# Patient Record
Sex: MALE | NOT HISPANIC OR LATINO | URBAN - METROPOLITAN AREA
[De-identification: names, ages, dates, MRNs, and addresses within clinical notes are randomized per-mention and may not be internally consistent; named-entity substitution may affect disease eponyms.]

---

## 2024-01-31 ENCOUNTER — OFFICE VISIT (OUTPATIENT)
Dept: PRIMARY CARE | Facility: CLINIC | Age: 29
End: 2024-01-31
Payer: COMMERCIAL

## 2024-01-31 VITALS
OXYGEN SATURATION: 97 % | SYSTOLIC BLOOD PRESSURE: 126 MMHG | DIASTOLIC BLOOD PRESSURE: 89 MMHG | HEIGHT: 77 IN | WEIGHT: 246 LBS | HEART RATE: 87 BPM | BODY MASS INDEX: 29.05 KG/M2

## 2024-01-31 DIAGNOSIS — F33.40 RECURRENT MAJOR DEPRESSIVE DISORDER, IN REMISSION (CMS-HCC): ICD-10-CM

## 2024-01-31 DIAGNOSIS — Z00.00 HEALTH CARE MAINTENANCE: Primary | ICD-10-CM

## 2024-01-31 DIAGNOSIS — L30.9 ECZEMA, UNSPECIFIED TYPE: ICD-10-CM

## 2024-01-31 DIAGNOSIS — J45.40 MODERATE PERSISTENT ASTHMA WITHOUT COMPLICATION (HHS-HCC): ICD-10-CM

## 2024-01-31 PROBLEM — J45.909 ASTHMA (HHS-HCC): Status: ACTIVE | Noted: 2024-01-31

## 2024-01-31 PROBLEM — J45.909 ASTHMA (HHS-HCC): Status: RESOLVED | Noted: 2024-01-31 | Resolved: 2024-01-31

## 2024-01-31 PROCEDURE — 1036F TOBACCO NON-USER: CPT | Performed by: INTERNAL MEDICINE

## 2024-01-31 PROCEDURE — 99395 PREV VISIT EST AGE 18-39: CPT | Performed by: INTERNAL MEDICINE

## 2024-01-31 RX ORDER — LORATADINE 10 MG/1
10 TABLET ORAL DAILY
COMMUNITY
Start: 2022-09-20

## 2024-01-31 RX ORDER — BUDESONIDE AND FORMOTEROL FUMARATE DIHYDRATE 80; 4.5 UG/1; UG/1
2 AEROSOL RESPIRATORY (INHALATION) EVERY 12 HOURS
Qty: 10.2 G | Refills: 1 | Status: SHIPPED
Start: 2024-01-31 | End: 2024-02-05 | Stop reason: SDUPTHER

## 2024-01-31 RX ORDER — BUPROPION HYDROCHLORIDE 300 MG/1
300 TABLET ORAL EVERY MORNING
COMMUNITY
Start: 2024-01-18

## 2024-01-31 RX ORDER — ALBUTEROL SULFATE 90 UG/1
2 AEROSOL, METERED RESPIRATORY (INHALATION) EVERY 4 HOURS PRN
Qty: 8.5 G | Refills: 0 | Status: SHIPPED | OUTPATIENT
Start: 2024-01-31 | End: 2025-01-30

## 2024-01-31 RX ORDER — EPINEPHRINE 0.3 MG/.3ML
1 INJECTION INTRAMUSCULAR ONCE AS NEEDED
COMMUNITY

## 2024-01-31 RX ORDER — BUDESONIDE AND FORMOTEROL FUMARATE DIHYDRATE 80; 4.5 UG/1; UG/1
2 AEROSOL RESPIRATORY (INHALATION) EVERY 12 HOURS
COMMUNITY
Start: 2022-06-02 | End: 2024-01-31 | Stop reason: SDUPTHER

## 2024-01-31 NOTE — PROGRESS NOTES
Subjective   Patient ID: Odell Anton is a 28 y.o. male who presents for [facility] (New patient here to [facility]).        HPI     Patient is a 28-year-old male with past medical history of major depression anxiety obesity eczema and asthma who presents to [facility] care and for wellness.  Patient last saw a physician who has since retired from [facility].  He has an allergist whom he has seen in [facility] however has not been seen recently.    He denies any complaints at this time however he does use Symbicort and he needs a prior authorization for it.  He takes 2 puffs twice daily no recent exacerbations and no recent hospitalizations for his asthma.  He did go to the emergency room in mid 2023 for abdominal pain rule out appendicitis however was unremarkable and subsequently with conservative care.    Patient has a girlfriend.  He has a job working on [employer].     He has a psychiatrist and a psychologist whom he follows for major depression.  Recently started on bupropion.  Of note he has gained 30 pounds in the last 6 months however recently since starting the Wellbutrin he is lost 8 pounds.  He is making a concerted effort to help lose weight.  He states that he eats home-cooked meals regularly but goes out twice weekly.    No other complaints at this time    Review of Systems  Constitutional: No fever or chills, No Night Sweats  Eyes: No Blurry Vision or Eye sight problems  ENT: No Nasal Discharge, Hoarseness, sore throat  Cardiovascular: no chest pain, no palpitations and no syncope.   Respiratory: no cough, no shortness of breath during exertion and no shortness of breath at rest.   Gastrointestinal: no abdominal pain, no nausea and no vomiting.   : No issues with urinary stream, burning with urination, no blood in urine or stools  Skin: No Skin rashes or Lesions  Neuro: No Headache, no dizziness or Numbness or tingling  Psych: No Anxiety, depression or sleeping problems  Heme: No  "Easy bleeding or brusing.     Objective   /89   Pulse 87   Ht 1.956 m (6' 5\")   Wt 112 kg (246 lb)   SpO2 97%   BMI 29.17 kg/m²     Physical Exam  Constitutional: Alert and in no acute distress. Well developed, well nourished.   Head and Face: Head and face: Normal.    Eyes: Normal external exam. Pupils were equal in size, round, reactive to light (PERRL) with normal accommodation and extraocular movements intact (EOMI).   Ears, Nose, Mouth, and Throat: External inspection of ears and nose: Normal.  Hearing: Normal.  Nasal mucosa, septum, and turbinates: Normal.  Lips, teeth, and gums: Normal.  Oropharynx: Normal.   Neck: No neck mass was observed. Supple. Thyroid not enlarged and there were no palpable thyroid nodules.   Cardiovascular: Heart rate and rhythm were normal, normal S1 and S2. Pedal pulses: Normal. No peripheral edema.   Pulmonary: No respiratory distress. Clear bilateral breath sounds.   Abdomen: Soft nontender; no abdominal mass palpated. Normal bowel sounds. No organomegaly.   : Deferred  Musculoskeletal: No joint swelling seen, normal movements of all extremities. Range of motion: Normal.  Muscle strength/tone: Normal.    Skin: Eczematous rash on the anterior and posterior torso  Neurologic: Deep tendon reflexes were 2+ and symmetric.   Psychiatric: Judgment and insight: Intact. Mood and affect: Normal.  Lymphatic: No cervical lymphadenopathy. Palpation of lymph nodes in axillae: Normal.  Palpation of lymph nodes in groin: Normal.    No results found for: \"WBC\", \"HGB\", \"HCT\", \"PLT\", \"CHOL\", \"TRIG\", \"HDL\", \"LDLDIRECT\", \"ALT\", \"AST\", \"NA\", \"K\", \"CL\", \"CREATININE\", \"BUN\", \"CO2\", \"TSH\", \"PSA\", \"INR\", \"GLUF\", \"HGBA1C\", \"ALBUR\"    XR hand right 3+ views  PROCEDURE:         HAND RT MIN 3 VIEW - IXR  0055  REASON FOR EXAM: thumb injury    RESULT: HAND RT MIN 3 VIEW: 10/1/2019 10:05 PM    CLINICAL HISTORY: Pain after jamming the thumb earlier today    COMPARISON: None.    TECHNIQUE: Three  views " of the Right hand were performed with additional  view of right thumb completed..    FINDINGS:    Bones: The bones appear intact.    Joints: The joints are maintained.    Soft tissues: Unremarkable.    IMPRESSION:    No acute bony abnormality..    [id number]    This report has been produced using speech recognition.    Original Interpreting Physician:   RICHARD MARTINEZ M.D.  Original Transcribed by/Date: PSCB   Oct  1 2019 10:21P  Original Electronically Signed by/Date: RICHARD MARTINEZ M.D. Oct  1 2019 10:21P    Addendum Interpreting Physician:  Addendum Transcribed by/Date: NO ADDENDUM  Addendum Electronically Signed by/Date:      Assessment/Plan   Problem List Items Addressed This Visit             ICD-10-CM    Moderate persistent asthma without complication J45.40     Will provide refill of the Symbicort.  May require prior authorization.  Will provide albuterol as needed         Relevant Medications    budesonide-formoteroL (Symbicort) 80-4.5 mcg/actuation inhaler    albuterol (ProAir HFA) 90 mcg/actuation inhaler    Eczema L30.9     Continue Claritin and follow-up with allergist         Recurrent major depressive disorder, in remission ([facility]) F33.40     Patient following with psychiatry.  Recently placed on Wellbutrin with good results.  Continue with weight reduction efforts.  Continue following with psychologist          Other Visit Diagnoses         Codes    Health care maintenance    -  Primary Z00.00              Dear Odell Anton     It was my pleasure to take care of you today in the office. Below are the things we discussed today:    1. Immunizations: Yearly Flu shot is recommended.        Please find out if you are up-to-date on your hep B vaccine    2. Blood Work: Up-to-date  3. Seen your dentist twice a year  4. Yearly Eye exam is recommended    5. BMI: 29.2  6: Diet recommendations:   Eat Clean, Try to have as many home cooked meals as possible  Avoid processed foods which contain excess calories, sugar,  and sodium.    7. Exercise recommendations:   150 minutes a week to maintain your weight     If you have to loose weight, you need a better diet and exercise plan.     8. Please get your Living will / Advance directive completed if you do not have one already. Please make sure our office has a copy of the latest one.     9. Colonoscopy: N/A  10. PSA: N/A    11.     Follow up in one year for a Physical. Please call the office before your Physical to see if you need blood work completed prior to your physical.     Please call me if any questions arise from now until your next visit. I will call you after I am done seeing patients. A Doctor is always available by phone when the office is closed. Please feel free to call for help with any problem that you feel shouldn't wait until the office re-opens.     Remy Fletcher, DO

## 2024-01-31 NOTE — ASSESSMENT & PLAN NOTE
Patient following with psychiatry.  Recently placed on Wellbutrin with good results.  Continue with weight reduction efforts.  Continue following with psychologist

## 2024-01-31 NOTE — ASSESSMENT & PLAN NOTE
Will provide refill of the Symbicort.  May require prior authorization.  Will provide albuterol as needed

## 2024-02-05 DIAGNOSIS — J45.40 MODERATE PERSISTENT ASTHMA WITHOUT COMPLICATION (HHS-HCC): ICD-10-CM

## 2024-02-06 RX ORDER — BUDESONIDE AND FORMOTEROL FUMARATE DIHYDRATE 80; 4.5 UG/1; UG/1
2 AEROSOL RESPIRATORY (INHALATION) EVERY 12 HOURS
Qty: 30.6 G | Refills: 3 | Status: SHIPPED | OUTPATIENT
Start: 2024-02-06 | End: 2024-04-23 | Stop reason: SDUPTHER

## 2024-03-06 ENCOUNTER — PATIENT MESSAGE (OUTPATIENT)
Dept: PRIMARY CARE | Facility: CLINIC | Age: 29
End: 2024-03-06
Payer: COMMERCIAL

## 2024-04-23 DIAGNOSIS — J45.40 MODERATE PERSISTENT ASTHMA WITHOUT COMPLICATION (HHS-HCC): ICD-10-CM

## 2024-04-24 RX ORDER — BUDESONIDE AND FORMOTEROL FUMARATE DIHYDRATE 80; 4.5 UG/1; UG/1
2 AEROSOL RESPIRATORY (INHALATION) EVERY 12 HOURS
Qty: 10.2 G | Refills: 0 | Status: SHIPPED | OUTPATIENT
Start: 2024-04-24 | End: 2024-06-05

## 2024-06-04 DIAGNOSIS — J45.40 MODERATE PERSISTENT ASTHMA WITHOUT COMPLICATION (HHS-HCC): ICD-10-CM

## 2024-06-05 RX ORDER — BUDESONIDE AND FORMOTEROL FUMARATE DIHYDRATE 80; 4.5 UG/1; UG/1
2 AEROSOL RESPIRATORY (INHALATION) EVERY 12 HOURS
Qty: 10.2 G | Refills: 2 | Status: SHIPPED | OUTPATIENT
Start: 2024-06-05

## 2024-11-07 ENCOUNTER — APPOINTMENT (OUTPATIENT)
Dept: PRIMARY CARE | Facility: CLINIC | Age: 29
End: 2024-11-07
Payer: COMMERCIAL

## 2024-11-07 VITALS
HEART RATE: 105 BPM | OXYGEN SATURATION: 94 % | HEIGHT: 77 IN | BODY MASS INDEX: 28.1 KG/M2 | WEIGHT: 238 LBS | SYSTOLIC BLOOD PRESSURE: 137 MMHG | DIASTOLIC BLOOD PRESSURE: 82 MMHG

## 2024-11-07 DIAGNOSIS — Z00.00 HEALTH CARE MAINTENANCE: Primary | ICD-10-CM

## 2024-11-07 DIAGNOSIS — T78.2XXD ANAPHYLAXIS, SUBSEQUENT ENCOUNTER: ICD-10-CM

## 2024-11-07 DIAGNOSIS — Z30.09 VASECTOMY EVALUATION: ICD-10-CM

## 2024-11-07 DIAGNOSIS — J45.40 MODERATE PERSISTENT ASTHMA WITHOUT COMPLICATION (HHS-HCC): ICD-10-CM

## 2024-11-07 PROBLEM — I10 PRIMARY HYPERTENSION: Status: ACTIVE | Noted: 2024-11-07

## 2024-11-07 PROCEDURE — 3075F SYST BP GE 130 - 139MM HG: CPT | Performed by: INTERNAL MEDICINE

## 2024-11-07 PROCEDURE — 1036F TOBACCO NON-USER: CPT | Performed by: INTERNAL MEDICINE

## 2024-11-07 PROCEDURE — 3079F DIAST BP 80-89 MM HG: CPT | Performed by: INTERNAL MEDICINE

## 2024-11-07 PROCEDURE — 3008F BODY MASS INDEX DOCD: CPT | Performed by: INTERNAL MEDICINE

## 2024-11-07 PROCEDURE — 99395 PREV VISIT EST AGE 18-39: CPT | Performed by: INTERNAL MEDICINE

## 2024-11-07 RX ORDER — EPINEPHRINE 0.3 MG/.3ML
1 INJECTION INTRAMUSCULAR ONCE AS NEEDED
Qty: 2 EACH | Refills: 0 | Status: SHIPPED | OUTPATIENT
Start: 2024-11-07

## 2024-11-07 RX ORDER — BUDESONIDE AND FORMOTEROL FUMARATE DIHYDRATE 80; 4.5 UG/1; UG/1
2 AEROSOL RESPIRATORY (INHALATION) EVERY 12 HOURS
Qty: 10.2 G | Refills: 2 | Status: SHIPPED | OUTPATIENT
Start: 2024-11-07

## 2024-11-07 NOTE — PROGRESS NOTES
"Subjective   Patient ID: Odell Anton is a 29 y.o. male who presents for Annual Exam.        HPI     Patient is a 29-year-old male with past medical history of major depression anxiety obesity eczema and asthma who presents to establish care and for wellness.      He denies any complaints at this time however he does use Symbicort.  He takes 2 puffs twice daily no recent exacerbations and no recent hospitalizations for his asthma.     Patient has a girlfriend.  He has a job working on [employer].  He has 2 sexual partners    Mood is stable on Wellbutrin.  Overall doing well.    No other complaints at this time besides regularly.  Denies illicit substances smoking and drinks alcohol occasionally    Review of Systems  Constitutional: No fever or chills, No Night Sweats  Eyes: No Blurry Vision or Eye sight problems  ENT: No Nasal Discharge, Hoarseness, sore throat  Cardiovascular: no chest pain, no palpitations and no syncope.   Respiratory: no cough, no shortness of breath during exertion and no shortness of breath at rest.   Gastrointestinal: no abdominal pain, no nausea and no vomiting.   : No issues with urinary stream, burning with urination, no blood in urine or stools  Skin: No Skin rashes or Lesions  Neuro: No Headache, no dizziness or Numbness or tingling  Psych: No Anxiety, depression or sleeping problems  Heme: No Easy bleeding or brusing.     Objective   /82   Pulse 105   Ht 1.956 m (6' 5\")   Wt 108 kg (238 lb)   SpO2 94%   BMI 28.22 kg/m²     Physical Exam  Constitutional: Alert and in no acute distress. Well developed, well nourished.   Head and Face: Head and face: Normal.    Eyes: Normal external exam. Pupils were equal in size, round, reactive to light (PERRL) with normal accommodation and extraocular movements intact (EOMI).   Ears, Nose, Mouth, and Throat: External inspection of ears and nose: Normal.  Hearing: Normal.  Nasal mucosa, septum, and turbinates: Normal.  Lips, teeth, and " "gums: Normal.  Oropharynx: Normal.   Neck: No neck mass was observed. Supple. Thyroid not enlarged and there were no palpable thyroid nodules.   Cardiovascular: Heart rate and rhythm were normal, normal S1 and S2. Pedal pulses: Normal. No peripheral edema.   Pulmonary: No respiratory distress. Clear bilateral breath sounds.   Abdomen: Soft nontender; no abdominal mass palpated. Normal bowel sounds. No organomegaly.   : Deferred  Musculoskeletal: No joint swelling seen, normal movements of all extremities. Range of motion: Normal.  Muscle strength/tone: Normal.    Skin: Eczematous rash on the anterior and posterior torso  Neurologic: Deep tendon reflexes were 2+ and symmetric.   Psychiatric: Judgment and insight: Intact. Mood and affect: Normal.  Lymphatic: No cervical lymphadenopathy. Palpation of lymph nodes in axillae: Normal.  Palpation of lymph nodes in groin: Normal.    No results found for: \"WBC\", \"HGB\", \"HCT\", \"PLT\", \"CHOL\", \"TRIG\", \"HDL\", \"LDLDIRECT\", \"ALT\", \"AST\", \"NA\", \"K\", \"CL\", \"CREATININE\", \"BUN\", \"CO2\", \"TSH\", \"PSA\", \"INR\", \"GLUF\", \"HGBA1C\", \"ALBUR\"    XR hand right 3+ views  PROCEDURE:         HAND RT MIN 3 VIEW - IXR  0055  REASON FOR EXAM: thumb injury    RESULT: HAND RT MIN 3 VIEW: 10/1/2019 10:05 PM    CLINICAL HISTORY: Pain after jamming the thumb earlier today    COMPARISON: None.    TECHNIQUE: Three  views of the Right hand were performed with additional  view of right thumb completed..    FINDINGS:    Bones: The bones appear intact.    Joints: The joints are maintained.    Soft tissues: Unremarkable.    IMPRESSION:    No acute bony abnormality..    [id number]    This report has been produced using speech recognition.    Original Interpreting Physician:   RICHARD MARTINEZ M.D.  Original Transcribed by/Date: PSCB   Oct  1 2019 10:21P  Original Electronically Signed by/Date: RICHARD MARTINEZ M.D. Oct  1 2019 10:21P    Addendum Interpreting Physician:  Addendum Transcribed by/Date: NO ADDENDUM  Addendum " Electronically Signed by/Date:      Assessment/Plan   Problem List Items Addressed This Visit             ICD-10-CM    Moderate persistent asthma without complication ([facility]) J45.40    Relevant Medications    budesonide-formoteroL (Symbicort) 80-4.5 mcg/actuation inhaler    Health care maintenance - Primary Z00.00    Relevant Medications    EPINEPHrine (EpiPen 2-Jerry) 0.3 mg/0.3 mL injection syringe    Other Relevant Orders    CBC    Comprehensive metabolic panel    Lipid Panel    TSH with reflex to Free T4 if abnormal     Other Visit Diagnoses         Codes    Anaphylaxis, subsequent encounter     T78.2XXD    Relevant Medications    EPINEPHrine (EpiPen 2-Jerry) 0.3 mg/0.3 mL injection syringe    Vasectomy evaluation     Z30.09    Relevant Orders    Referral to Urology              Dear Odell Anton     It was my pleasure to take care of you today in the office. Below are the things we discussed today:    1. Immunizations: Yearly Flu shot is recommended.        Please find out if you are up-to-date on your hep B vaccine    2. Blood Work: Up-to-date  3. Seen your dentist twice a year  4. Yearly Eye exam is recommended    5. BMI: 28.2  6: Diet recommendations:   Eat Clean, Try to have as many home cooked meals as possible  Avoid processed foods which contain excess calories, sugar, and sodium.    7. Exercise recommendations:   150 minutes a week to maintain your weight     If you have to loose weight, you need a better diet and exercise plan.     8. Please get your Living will / Advance directive completed if you do not have one already. Please make sure our office has a copy of the latest one.     9. Colonoscopy: N/A  10. PSA: N/A    11.     Follow up in one year for a Physical. Please call the office before your Physical to see if you need blood work completed prior to your physical.     Please call me if any questions arise from now until your next visit. I will call you after I am done seeing patients. A Doctor is  always available by phone when the office is closed. Please feel free to call for help with any problem that you feel shouldn't wait until the office re-opens.     Remy Fletcher, DO

## 2024-12-11 DIAGNOSIS — J45.40 MODERATE PERSISTENT ASTHMA WITHOUT COMPLICATION (HHS-HCC): ICD-10-CM

## 2024-12-12 RX ORDER — FLUTICASONE PROPIONATE AND SALMETEROL 100; 50 UG/1; UG/1
1 POWDER RESPIRATORY (INHALATION) EVERY 12 HOURS
Qty: 60 EACH | Refills: 10 | Status: SHIPPED | OUTPATIENT
Start: 2024-12-12

## 2025-01-20 ENCOUNTER — APPOINTMENT (OUTPATIENT)
Dept: UROLOGY | Facility: CLINIC | Age: 30
End: 2025-01-20
Payer: COMMERCIAL

## 2025-01-20 VITALS — HEIGHT: 77 IN | TEMPERATURE: 98.3 F | BODY MASS INDEX: 27.75 KG/M2 | WEIGHT: 235 LBS

## 2025-01-20 DIAGNOSIS — Z98.52 S/P VASECTOMY: ICD-10-CM

## 2025-01-20 DIAGNOSIS — Z30.09 ENCOUNTER FOR VASECTOMY COUNSELING: Primary | ICD-10-CM

## 2025-01-20 DIAGNOSIS — Z30.2 ENCOUNTER FOR VASECTOMY: ICD-10-CM

## 2025-01-20 PROCEDURE — 99204 OFFICE O/P NEW MOD 45 MIN: CPT | Performed by: STUDENT IN AN ORGANIZED HEALTH CARE EDUCATION/TRAINING PROGRAM

## 2025-01-20 PROCEDURE — 3008F BODY MASS INDEX DOCD: CPT | Performed by: STUDENT IN AN ORGANIZED HEALTH CARE EDUCATION/TRAINING PROGRAM

## 2025-01-20 ASSESSMENT — PAIN SCALES - GENERAL: PAINLEVEL_OUTOF10: 0-NO PAIN

## 2025-01-20 NOTE — PROGRESS NOTES
Odell presents as a new patient to discuss vasectomy.   The patient’s EMR has been reviewed.  Lives in [place], [place].  Occupation: Works at a [employer] company.   Status: Relationship for last 6 years.   Children: None.     History Of Present Illness [HPI]:  Denies wanting any further children in the future.   Acknowledges mutual agreement with Partner.  His Partner would like to have her IUD removed.   Denies any testicular pain or scrotal swelling.  Denies any urinary issues or sexual health concerns.     PMH: Asthma.   PSH: Willcox tooth removal.   FH: Denies FH of  malignancy.  SH: Non-smoker.     No past medical history on file.  Past Surgical History:   Procedure Laterality Date    OTHER SURGICAL HISTORY  09/20/2022    Willcox tooth extraction     No family history on file.  Social History     Tobacco Use   Smoking Status Never   Smokeless Tobacco Never     Current Outpatient Medications   Medication Sig Dispense Refill    albuterol (ProAir HFA) 90 mcg/actuation inhaler Inhale 2 puffs every 4 hours if needed for wheezing or shortness of breath. 8.5 g 0    buPROPion XL (Wellbutrin XL) 300 mg 24 hr tablet Take 1 tablet (300 mg) by mouth once daily in the morning.      EPINEPHrine (EpiPen 2-Jerry) 0.3 mg/0.3 mL injection syringe Inject 0.3 mL (0.3 mg) into the muscle 1 time if needed for anaphylaxis. 2 each 0    fluticasone propion-salmeteroL (Advair Diskus) 100-50 mcg/dose diskus inhaler TAKE 1 PUFF BY MOUTH EVERY 12 HOURS 60 each 10    loratadine (Claritin) 10 mg tablet Take 1 tablet (10 mg) by mouth once daily.       No current facility-administered medications for this visit.     Allergies   Allergen Reactions    Shellfish Containing Products Anaphylaxis    Peanut Other     Past medical, surgical, family and social history in the chart was reviewed and is accurate including any additions to what is in this HPI.    REVIEW OF SYSTEMS [ROS]:  Constitutional: denies any unintentional weight loss or change in  strength.  Integumentary: denies any rashes or pruritus.  Eyes: denies any double vision or eye pain.  Ear/Nose/Mouth/Throat: denies any nosebleeds or gum bleeds.  Cardiovascular: denies any chest pain or syncope.  Respiratory: denies hemoptysis.  Gastrointestinal: denies nausea or vomiting.  Musculoskeletal: denies muscle cramping or weakness.  Neurologic: denies convulsions or seizures.  Hematologic/Lymphatic: denies bleeding tendencies.  Endocrine: denies heat/cold intolerance.  All other systems have been reviewed and are negative unless otherwise noted in the HPI.     OBJECTIVE:  Visit Vitals  Temp 36.8 °C (98.3 °F)     PHYSICAL EXAM:  Constitutional: No obvious distress.  Eyes: Non-injected conjunctiva, sclera clear, EOMI.  Ears/Nose/Mouth/Throat: No obvious drainage per ears or nose.  Cardiovascular: Extremities are warm and well perfused. No edema, cyanosis or pallor.  Respiratory: No audible wheezing/stridor; respirations do not appear labored.  Gastrointestinal: Abdomen soft, not distended.  Musculoskeletal: Normal ROM of extremities.  Skin: No obvious rashes or open sores.  Neurologic: Alert and oriented, CN 2-12 grossly intact.  Psychiatric: Answers questions appropriately with normal affect.  Hematologic/Lymphatic/Immunologic: No obvious bruises or sites of spontaneous bleeding.  Genitourinary: No CVA tenderness, bladder not palpable.   Vas deferens palpated bilaterally.        ASSESSMENT/PLAN:  Problem List Items Addressed This Visit    None  Visit Diagnoses       S/P vasectomy        Relevant Orders    POCT Sperm Presence and Motility Post Vas    Encounter for vasectomy        Relevant Orders    Vasectomy           Vasectomy Counseling:  Vasectomy is intended to be a permanent form of contraception.  Vasectomy does not produce immediate sterility.  Following vasectomy, another form of contraception is required until vas occlusion is confirmed by post-vasectomy semen analysis (PVSA).  Even after vas  occlusion is confirmed, vasectomy is not 100% reliable in preventing pregnancy.  The risk of pregnancy after vasectomy is approximately 1 in 2,000 for men who have post-vasectomy azoospermia or PVSA showing rare non-motile sperm (RNMS).  Repeat vasectomy is necessary in <1% of vasectomies, provided that a technique for vas occlusion known to have a low occlusive failure rate has been used.  Patient should refrain from ejaculation for approximately one week after vasectomy.  Options for fertility after vasectomy reversal and sperm retrieval with in vitro fertilization. These options are not always successful, and they may be expensive.    Patient understand that he must have protected intercourse after the procedure (vasectomy) to the point where he is able to provide a negative semen sample. He may stop using other methods of ocntraception when examination of one well-mixed, uncentrifuged, fresh post-vasectomy semen specimen shows azoospermia or only rare non-motile sperm (RNMS or <100,000 non-motile sperm/mL).    Will proceed with vasectomy in clinic under local.  Rx for valium sent to the patient pharmacy, to be taken morning of the procedure.   All questions were answered to the patient’s satisfaction.  Patient agrees with the plan and wishes to proceed.    Scribed for Dr. Abelino Tian by Adalid Gutierrez.  I, Dr. Abelino Tian, have personally reviewed and agreed with the information entered by the Virtual Scribe. 01/20/25

## 2025-01-21 RX ORDER — DIAZEPAM 5 MG/1
5 TABLET ORAL
Qty: 1 TABLET | Refills: 0 | Status: SHIPPED | OUTPATIENT
Start: 2025-01-21 | End: 2025-01-21

## 2025-02-20 ENCOUNTER — APPOINTMENT (OUTPATIENT)
Dept: UROLOGY | Facility: CLINIC | Age: 30
End: 2025-02-20
Payer: COMMERCIAL

## 2025-02-20 VITALS
DIASTOLIC BLOOD PRESSURE: 88 MMHG | HEIGHT: 77 IN | TEMPERATURE: 97.9 F | BODY MASS INDEX: 27.87 KG/M2 | HEART RATE: 98 BPM | SYSTOLIC BLOOD PRESSURE: 128 MMHG | WEIGHT: 236 LBS

## 2025-02-20 DIAGNOSIS — Z30.2 ENCOUNTER FOR VASECTOMY: Primary | ICD-10-CM

## 2025-02-20 DIAGNOSIS — Z98.52 S/P VASECTOMY: ICD-10-CM

## 2025-02-20 PROCEDURE — 55250 REMOVAL OF SPERM DUCT(S): CPT | Performed by: STUDENT IN AN ORGANIZED HEALTH CARE EDUCATION/TRAINING PROGRAM

## 2025-02-20 ASSESSMENT — PAIN SCALES - GENERAL: PAINLEVEL_OUTOF10: 0-NO PAIN

## 2025-02-20 NOTE — PROGRESS NOTES
Patient ID: Odell Anton is a 29 y.o. male.    Vasectomy    Date/Time: 2/20/2025 1:25 PM    Performed by: Abelino Tian MD  Authorized by: Abelino Tian MD      Procedure discussed: discussed risks, benefits, and alternatives    Timeout: timeout called immediately prior to procedure    Prep: patient was prepped and draped in usual sterile fashion    Anesthesia: local anesthesia used    Local anesthetic: lidocaine without epinephrine    Procedure Details:     Indications: desire for sterilization      Laterality: bilateral      Incisions: 2      Right Vas Deferens:      Hemoclips applied: no        Cauterized: yes         Left Vas Deferens:      Hemoclips applied: no        Cauterized: yes          Skin closure: suture       Skin closure comment: chromic    Post-Procedure Details:     Outcome: patient tolerated procedure well with no complications      Post-procedure interventions: sterile dressing applied and wound care instructions given      Disposition: discharged home in satisfactory condition        The appropriate consent was obtained. Elective in-office vasectomy was completed today without complication and he tolerated the procedure well. A spermatic cord block was performed bilaterally with 10 cc’s lidocaine and allowed to sit for 15 minutes.     A small opening was created in the skin. The left and then right vas segments were isolated and a segment was removed. The ends were tied with silk suture and cauterized. Hemostasis was achieved with cautery. The incisions were closed with two chromic sutures each. Both sides appeared hemostatic at the end of the procedure.     Post-operative instruction were provided. Orders placed for PVSA in 2-3 months. He will continue secondary contraception until no sperm is documented in post-vasectomy semen specimen. We will contact him for results and he can follow up as needed or preferred.

## 2025-03-06 DIAGNOSIS — Z98.52 STATUS POST VASECTOMY: ICD-10-CM

## 2025-03-06 RX ORDER — SULFAMETHOXAZOLE AND TRIMETHOPRIM 800; 160 MG/1; MG/1
1 TABLET ORAL 2 TIMES DAILY
Qty: 14 TABLET | Refills: 0 | Status: SHIPPED | OUTPATIENT
Start: 2025-03-06 | End: 2025-03-13

## 2025-06-04 ENCOUNTER — ANCILLARY PROCEDURE (OUTPATIENT)
Dept: ENDOCRINOLOGY | Facility: CLINIC | Age: 30
End: 2025-06-04
Payer: COMMERCIAL

## 2025-06-04 DIAGNOSIS — Z98.52 S/P VASECTOMY: ICD-10-CM

## 2025-06-04 LAB
ABSTINENCE (DAYS): 6 DAYS (ref 2–7)
AGGLUTINATION (SEMEN): NO
AMOUNT MISSED:: ABNORMAL
ANALYZED TIME:: ABNORMAL
ANDROLOGY LAB ID#: ABNORMAL
CLUMPS (SEMEN): NO
COLLECTED COMPLETELY: YES
COLLECTION LOCATION:: ABNORMAL
COLLECTION METHOD:: ABNORMAL
CONCENTRATION CN (POST-WASH): 0 MILL/ML
CONCENTRATION(SEMEN): 0 MILL/ML (ref 15–?)
DEBRIS (SEMEN): YES
DEGREE (SEMEN): ABNORMAL
LEUKOCYTE (SEMEN): ABNORMAL
NON PROG. MOTILITY (SEMEN): 0 %
NON PROG. MOTILITY CN (POST-WASH): 0 %
PATTERN (SEMEN): ABNORMAL
PORTION MISSED REI: ABNORMAL
PROG. MOTILITY (SEMEN): 0 % (ref 32–?)
PROG. MOTILITY CN (POST-WASH): 0 %
RECEIVED TIME:: ABNORMAL
SEMEN APPEARANCE: NORMAL
SEMEN LIQUEFACTION: NORMAL
SEMEN VISCOSITY: NORMAL
TOTAL MOTILITY (SEMEN): 0 % (ref 40–?)
TOTAL MOTILITY CN (POST-WASH): 0 %
TOTAL NO OF MOTILE (SEMEN): 0 MILL
TOTAL NO OF MOTILE CN (POST-WASH): 0 MILL
TOTAL NO OF RND CELLS (SEMEN): ABNORMAL (ref ?–5)
TOTAL NO OF SPERM (SEMEN): 0 MILL (ref 39–?)
TOTAL NO OF SPERM CN (POST-WASH): 0 MILL
VOLUME (SEMEN): 2.9 ML (ref 1.5–?)
VOLUME CN (POST-WASH): 0.2 ML

## 2025-06-04 PROCEDURE — 89321 SEMEN ANAL SPERM DETECTION: CPT | Performed by: OBSTETRICS & GYNECOLOGY

## 2025-06-16 ENCOUNTER — DOCUMENTATION (OUTPATIENT)
Dept: UROLOGY | Facility: CLINIC | Age: 30
End: 2025-06-16
Payer: COMMERCIAL

## 2025-06-16 NOTE — PROGRESS NOTES
Per Dr Tian ,  pvsa shows no sperm and he is safe to stop using contraception.  Pt notified and understands